# Patient Record
Sex: MALE | Race: WHITE | NOT HISPANIC OR LATINO
[De-identification: names, ages, dates, MRNs, and addresses within clinical notes are randomized per-mention and may not be internally consistent; named-entity substitution may affect disease eponyms.]

---

## 2021-10-20 PROBLEM — Z00.00 ENCOUNTER FOR PREVENTIVE HEALTH EXAMINATION: Status: ACTIVE | Noted: 2021-10-20

## 2021-10-21 ENCOUNTER — NON-APPOINTMENT (OUTPATIENT)
Age: 21
End: 2021-10-21

## 2021-10-21 ENCOUNTER — APPOINTMENT (OUTPATIENT)
Dept: CARDIOLOGY | Facility: CLINIC | Age: 21
End: 2021-10-21
Payer: COMMERCIAL

## 2021-10-21 VITALS
HEART RATE: 100 BPM | SYSTOLIC BLOOD PRESSURE: 132 MMHG | DIASTOLIC BLOOD PRESSURE: 60 MMHG | OXYGEN SATURATION: 98 % | WEIGHT: 150 LBS | TEMPERATURE: 97.5 F | BODY MASS INDEX: 19.88 KG/M2 | HEIGHT: 73 IN

## 2021-10-21 DIAGNOSIS — Z78.9 OTHER SPECIFIED HEALTH STATUS: ICD-10-CM

## 2021-10-21 DIAGNOSIS — E05.90 THYROTOXICOSIS, UNSPECIFIED W/OUT THYROTOXIC CRISIS OR STORM: ICD-10-CM

## 2021-10-21 DIAGNOSIS — Z82.49 FAMILY HISTORY OF ISCHEMIC HEART DISEASE AND OTHER DISEASES OF THE CIRCULATORY SYSTEM: ICD-10-CM

## 2021-10-21 DIAGNOSIS — G25.0 ESSENTIAL TREMOR: ICD-10-CM

## 2021-10-21 DIAGNOSIS — Z83.49 FAMILY HISTORY OF OTHER ENDOCRINE, NUTRITIONAL AND METABOLIC DISEASES: ICD-10-CM

## 2021-10-21 DIAGNOSIS — R00.0 TACHYCARDIA, UNSPECIFIED: ICD-10-CM

## 2021-10-21 PROCEDURE — 99203 OFFICE O/P NEW LOW 30 MIN: CPT

## 2021-10-21 RX ORDER — METHIMAZOLE 10 MG/1
10 TABLET ORAL EVERY 8 HOURS
Refills: 0 | Status: ACTIVE | COMMUNITY

## 2021-10-21 NOTE — HISTORY OF PRESENT ILLNESS
[FreeTextEntry1] : Pedro is a 20-year-old male with history no prior medical or cardiac history.  Patient has family history of hyperthyroidism, mother had SVT ablation.\par \par Patient seen in the office today with his mother who is able to confirm accurate history.  Patient is a good historian. \par \par No history of CAD, MI, revascularization, VHD, CHF, TIA, CVA, diabetes, PVD, DVT, PE, arrhythmia, AF.\par \par Patient has had palpitations, tremor, anxiety, weight loss and heat intolerance over the past 5 months.  Patient  admitted [facility] 10/9/2021 with above symptoms sinus tachycardia to 130s, started on methimazole, hydrocortisone, propranolol with improvement in heart rate.  Inderal changed to atenolol patient discharged home on atenolol 50 mg twice daily.  current current dose of atenolol is 25 mg daily for heart rate , 50 mg daily for heart rate greater than 130 and holding dose for heart rate less than 80. \par \par EKG 10/21/2021 sinus tachycardia 102bpm, possible LVH with NSST likely age-related. \par \par Echocardiogram [facility] 10/9/2021 LVEF 60%, normal Doppler, normal chamber sizes and wall thickness\par \par Chest CTA negative for PE\par \par Labs October 2021 normal CBC, BMP, TSH less than 0.1

## 2021-10-21 NOTE — ASSESSMENT
[FreeTextEntry1] : Pedro is a 20-year-old male with medical history detailed above and active medical issues including:\par \par - Sinus tachycardia secondary to hyperthyroidism, improved heart rate on low-dose atenolol, continue follow recommendations above.  Structurally normal heart on echo Oct 2021.  If persistent tachycardia patient will call for cardiology evaluation to discuss medication adjustment.  Consider Zio patch and repeat echo if prolonged tachycardia.  Recommended increased oral hydration with electrolyte suppliment drinks, avoid caffeine and alcohol intake.\par \par Cardiology follow-up as needed basis at patient and mothers request.\par \par Pedro will follow up with Dr. Juan Bazzi for primary care\par \par

## 2022-05-09 RX ORDER — ATENOLOL 25 MG/1
25 TABLET ORAL TWICE DAILY
Qty: 180 | Refills: 1 | Status: ACTIVE | COMMUNITY
Start: 1900-01-01 | End: 1900-01-01